# Patient Record
Sex: MALE | Race: BLACK OR AFRICAN AMERICAN
[De-identification: names, ages, dates, MRNs, and addresses within clinical notes are randomized per-mention and may not be internally consistent; named-entity substitution may affect disease eponyms.]

---

## 2020-04-26 ENCOUNTER — HOSPITAL ENCOUNTER (EMERGENCY)
Dept: HOSPITAL 57 - BURERS | Age: 35
Discharge: HOME | End: 2020-04-26
Payer: COMMERCIAL

## 2020-04-26 DIAGNOSIS — Z79.899: ICD-10-CM

## 2020-04-26 DIAGNOSIS — J15.9: Primary | ICD-10-CM

## 2020-04-26 DIAGNOSIS — G47.30: ICD-10-CM

## 2020-04-26 PROCEDURE — 87804 INFLUENZA ASSAY W/OPTIC: CPT

## 2020-04-26 PROCEDURE — 71045 X-RAY EXAM CHEST 1 VIEW: CPT

## 2020-04-27 NOTE — RAD
PORTABLE CHEST:

 

DATE: 4/26/2020.

 

FINDINGS: 

An AP portable film at 2002 is submitted with no prior films available for comparison.

 

There is a dense right middle lobe consolidation consistent with pneumonia.  The left lung seems shaun
r.  The heart is slightly enlarged, but there is no vascular congestion or edema.  

 

IMPRESSION: 

1.  Dense right middle lobe consolidation.  The finding is most consistent with a bacterial pneumonia
.  Followup to resolution will be needed.

 

2.  Mild cardiomegaly.

 

Findings discussed with Dr. Reddy at 2027 on 4/26/2020.

 

CODE CR

 

POS: HOME